# Patient Record
Sex: MALE | Race: WHITE | NOT HISPANIC OR LATINO | Employment: FULL TIME | URBAN - METROPOLITAN AREA
[De-identification: names, ages, dates, MRNs, and addresses within clinical notes are randomized per-mention and may not be internally consistent; named-entity substitution may affect disease eponyms.]

---

## 2024-05-10 ENCOUNTER — HOSPITAL ENCOUNTER (OUTPATIENT)
Dept: RADIOLOGY | Facility: EXTERNAL LOCATION | Age: 39
Discharge: HOME | End: 2024-05-10
Payer: COMMERCIAL

## 2024-05-10 DIAGNOSIS — T14.90XA INJURY: ICD-10-CM

## 2024-06-18 ENCOUNTER — OFFICE VISIT (OUTPATIENT)
Dept: ORTHOPEDIC SURGERY | Facility: CLINIC | Age: 39
End: 2024-06-18
Payer: COMMERCIAL

## 2024-06-18 DIAGNOSIS — M79.642 LEFT HAND PAIN: ICD-10-CM

## 2024-06-18 DIAGNOSIS — S63.639A SPRAIN OF PROXIMAL INTERPHALANGEAL (PIP) JOINT OF FINGER: Primary | ICD-10-CM

## 2024-06-18 PROCEDURE — 99213 OFFICE O/P EST LOW 20 MIN: CPT | Performed by: ORTHOPAEDIC SURGERY

## 2024-06-18 PROCEDURE — 1036F TOBACCO NON-USER: CPT | Performed by: ORTHOPAEDIC SURGERY

## 2024-06-18 ASSESSMENT — PAIN SCALES - GENERAL: PAINLEVEL_OUTOF10: 7

## 2024-06-18 ASSESSMENT — PAIN - FUNCTIONAL ASSESSMENT: PAIN_FUNCTIONAL_ASSESSMENT: 0-10

## 2024-06-18 NOTE — PROGRESS NOTES
Reason for Appointment  Chief Complaint   Patient presents with    Left Little Finger - Pain     History of Present Illness  Patient is a 39 y.o. male here today for follow-up evaluation of finger pain interval history this pleasant gentleman jammed this finger and he may have dislocated it over 6 weeks ago and had x-rays a few days out in urgent care that were reviewed that show a reduced joint with no large fracture or subluxation but possible avulsion type fracture he is complaining of pain in his gotten good motion back but continued swelling dorsally at the PIP joint of his left small finger.  Pain with heavy gripping occasionally has some mild crepitation and that was reproduced on examination today.  No other changes past medical history allergies and medications.  He has been hannah taping.    History reviewed. No pertinent past medical history.    History reviewed. No pertinent surgical history.    Medication Documentation Review Audit       Reviewed by Luis Manuel Gtz MD (Physician) on 06/18/24 at 1226      Medication Order Taking? Sig Documenting Provider Last Dose Status            No Medications to Display                                   No Known Allergies    Review of Systems  No other constitutional symptoms no respiratory GI endocrine issues  Exam   Exam reveals dorsal swelling with slight 5 degree loss of extension but most of this is dorsal soft tissue swelling central slip is intact but with collateral ligament testing there does appear to be some ulnar collateral ligament instability and there is some crepitation recreation of pain with testing of the collateral ligaments but no gross instability no crepitation he can get a near full fist good pulses good sensation  Assessment   Encounter Diagnosis   Name Primary?    Left hand pain    Left small finger PIP joint severe sprain with possible collateral ligament instability    Plan   At this juncture most likely this was a near dislocation or  dislocation that reduced joint is in place but there may be subsequent collateral ligament chronic instability he is not interested in any further workup at this point because any intervention would be significant recovery and we have decided to see him back in a couple of months and I would get a repeat x-ray at that juncture or if he feels any subluxation or any new pain we will see him sooner.  We talked about Coban wrapping to get the swelling out and work on range of motion and extension we went over exercises today.

## 2024-08-20 ENCOUNTER — OFFICE VISIT (OUTPATIENT)
Dept: ORTHOPEDIC SURGERY | Facility: CLINIC | Age: 39
End: 2024-08-20
Payer: COMMERCIAL

## 2024-08-20 DIAGNOSIS — S63.639A SPRAIN OF PROXIMAL INTERPHALANGEAL (PIP) JOINT OF FINGER: ICD-10-CM

## 2024-08-20 PROCEDURE — 99213 OFFICE O/P EST LOW 20 MIN: CPT | Performed by: ORTHOPAEDIC SURGERY

## 2024-08-20 PROCEDURE — 1036F TOBACCO NON-USER: CPT | Performed by: ORTHOPAEDIC SURGERY

## 2024-08-20 ASSESSMENT — PAIN SCALES - GENERAL: PAINLEVEL_OUTOF10: 0 - NO PAIN

## 2024-08-20 ASSESSMENT — PAIN - FUNCTIONAL ASSESSMENT: PAIN_FUNCTIONAL_ASSESSMENT: 0-10

## 2024-08-21 NOTE — PROGRESS NOTES
Reason for Appointment  Chief Complaint   Patient presents with    Left Little Finger - Pain     History of Present Illness  Patient is a 39 y.o. male here today for follow-up evaluation of left small finger pain and injury, overall patient has been doing well motion has come back but still with some swelling and pain when he hits this area but is got most of his  strength back did not want to get an x-ray today and that is absolutely fine small avulsion fracture consistent with a sprain and near dislocation injury was seen before.  Able to  able to do most activities no increasing pain or recurrent injury.     History reviewed. No pertinent past medical history.    History reviewed. No pertinent surgical history.    Medication Documentation Review Audit       Reviewed by Luis Manuel Gtz MD (Physician) on 08/21/24 at 0823      Medication Order Taking? Sig Documenting Provider Last Dose Status            No Medications to Display                                   No Known Allergies    Review of Systems  Unchanged  Exam   Exam reveals slight flexion contracture but mostly still some dorsal swelling but central slip is intact collateral ligaments intact but slight pain with testing good composite  with minimal loss of flexion good sensation capillary refill  Assessment   Encounter Diagnosis   Name Primary?    Sprain of proximal interphalangeal (PIP) joint of finger        Plan     At this point he can continue scar massage swelling control and Coban wrapping and buddy taping as needed this should continue to improve for a few months if he has any increasing pain deformity or problems he is to call us immediately

## 2025-08-13 ENCOUNTER — ANCILLARY PROCEDURE (OUTPATIENT)
Dept: URGENT CARE | Age: 40
End: 2025-08-13
Payer: COMMERCIAL

## 2025-08-13 ENCOUNTER — OFFICE VISIT (OUTPATIENT)
Dept: URGENT CARE | Age: 40
End: 2025-08-13
Payer: COMMERCIAL

## 2025-08-13 VITALS
SYSTOLIC BLOOD PRESSURE: 131 MMHG | HEART RATE: 72 BPM | DIASTOLIC BLOOD PRESSURE: 88 MMHG | WEIGHT: 205 LBS | HEIGHT: 70 IN | OXYGEN SATURATION: 98 % | BODY MASS INDEX: 29.35 KG/M2 | TEMPERATURE: 98.1 F | RESPIRATION RATE: 14 BRPM

## 2025-08-13 DIAGNOSIS — M25.561 ACUTE PAIN OF RIGHT KNEE: ICD-10-CM

## 2025-08-13 DIAGNOSIS — S86.911A STRAIN OF RIGHT KNEE, INITIAL ENCOUNTER: Primary | ICD-10-CM

## 2025-08-13 PROCEDURE — 3008F BODY MASS INDEX DOCD: CPT

## 2025-08-13 PROCEDURE — 73564 X-RAY EXAM KNEE 4 OR MORE: CPT | Mod: RIGHT SIDE

## 2025-08-13 PROCEDURE — 1036F TOBACCO NON-USER: CPT

## 2025-08-13 PROCEDURE — 99213 OFFICE O/P EST LOW 20 MIN: CPT

## 2025-08-13 ASSESSMENT — ENCOUNTER SYMPTOMS
NUMBNESS: 0
FEVER: 0
ARTHRALGIAS: 1
FATIGUE: 0
CHILLS: 0
COLOR CHANGE: 0
COUGH: 0
SHORTNESS OF BREATH: 0
DIZZINESS: 0
WEAKNESS: 0
JOINT SWELLING: 0
BACK PAIN: 0

## 2025-08-14 ENCOUNTER — TELEPHONE (OUTPATIENT)
Dept: URGENT CARE | Age: 40
End: 2025-08-14

## 2025-08-16 ENCOUNTER — TELEPHONE (OUTPATIENT)
Dept: URGENT CARE | Age: 40
End: 2025-08-16

## 2025-08-22 ENCOUNTER — APPOINTMENT (OUTPATIENT)
Dept: ORTHOPEDIC SURGERY | Facility: CLINIC | Age: 40
End: 2025-08-22
Payer: COMMERCIAL

## 2025-08-22 VITALS — BODY MASS INDEX: 28.63 KG/M2 | HEIGHT: 70 IN | WEIGHT: 200 LBS

## 2025-08-22 DIAGNOSIS — S89.91XA INJURY OF MEDIAL COLLATERAL LIGAMENT OF RIGHT KNEE: Primary | ICD-10-CM

## 2025-08-22 DIAGNOSIS — S83.411A CHRONIC RUPTURE OF MCL OF RIGHT KNEE: ICD-10-CM

## 2025-08-22 DIAGNOSIS — M25.561 RIGHT KNEE PAIN, UNSPECIFIED CHRONICITY: ICD-10-CM

## 2025-08-22 DIAGNOSIS — S83.241A ACUTE MEDIAL MENISCUS TEAR OF RIGHT KNEE, INITIAL ENCOUNTER: ICD-10-CM

## 2025-08-22 PROCEDURE — 1036F TOBACCO NON-USER: CPT | Performed by: ORTHOPAEDIC SURGERY

## 2025-08-22 PROCEDURE — 99204 OFFICE O/P NEW MOD 45 MIN: CPT | Performed by: ORTHOPAEDIC SURGERY

## 2025-08-22 PROCEDURE — 3008F BODY MASS INDEX DOCD: CPT | Performed by: ORTHOPAEDIC SURGERY

## 2025-08-22 ASSESSMENT — PAIN - FUNCTIONAL ASSESSMENT: PAIN_FUNCTIONAL_ASSESSMENT: NO/DENIES PAIN

## 2025-09-04 ENCOUNTER — HOSPITAL ENCOUNTER (OUTPATIENT)
Dept: RADIOLOGY | Facility: HOSPITAL | Age: 40
Discharge: HOME | End: 2025-09-04
Payer: COMMERCIAL

## 2025-09-04 DIAGNOSIS — S83.411A CHRONIC RUPTURE OF MCL OF RIGHT KNEE: ICD-10-CM

## 2025-09-04 PROCEDURE — 73721 MRI JNT OF LWR EXTRE W/O DYE: CPT | Mod: RIGHT SIDE | Performed by: RADIOLOGY

## 2025-09-04 PROCEDURE — 73721 MRI JNT OF LWR EXTRE W/O DYE: CPT | Mod: RT
